# Patient Record
Sex: MALE | Race: OTHER | Employment: FULL TIME | URBAN - METROPOLITAN AREA
[De-identification: names, ages, dates, MRNs, and addresses within clinical notes are randomized per-mention and may not be internally consistent; named-entity substitution may affect disease eponyms.]

---

## 2022-06-19 ENCOUNTER — APPOINTMENT (OUTPATIENT)
Dept: CT IMAGING | Age: 32
DRG: 418 | End: 2022-06-19
Payer: COMMERCIAL

## 2022-06-19 ENCOUNTER — ANESTHESIA (OUTPATIENT)
Dept: OPERATING ROOM | Age: 32
DRG: 418 | End: 2022-06-19
Payer: COMMERCIAL

## 2022-06-19 ENCOUNTER — HOSPITAL ENCOUNTER (INPATIENT)
Age: 32
LOS: 2 days | Discharge: HOME | DRG: 418 | End: 2022-06-21
Attending: INTERNAL MEDICINE | Admitting: HOSPITALIST
Payer: COMMERCIAL

## 2022-06-19 ENCOUNTER — APPOINTMENT (OUTPATIENT)
Dept: GENERAL RADIOLOGY | Age: 32
DRG: 418 | End: 2022-06-19
Payer: COMMERCIAL

## 2022-06-19 ENCOUNTER — ANESTHESIA EVENT (OUTPATIENT)
Dept: OPERATING ROOM | Age: 32
DRG: 418 | End: 2022-06-19
Payer: COMMERCIAL

## 2022-06-19 DIAGNOSIS — R74.01 TRANSAMINITIS: ICD-10-CM

## 2022-06-19 DIAGNOSIS — K81.9 CHOLECYSTITIS: ICD-10-CM

## 2022-06-19 DIAGNOSIS — K81.0 ACUTE CHOLECYSTITIS: Primary | ICD-10-CM

## 2022-06-19 PROBLEM — R11.2 NAUSEA & VOMITING: Status: ACTIVE | Noted: 2022-06-19

## 2022-06-19 PROBLEM — R10.13 EPIGASTRIC ABDOMINAL PAIN: Status: ACTIVE | Noted: 2022-06-19

## 2022-06-19 LAB
A/G RATIO: 1 (ref 1.1–2.2)
A/G RATIO: 1.3 (ref 1.1–2.2)
ALBUMIN SERPL-MCNC: 3.8 G/DL (ref 3.4–5)
ALBUMIN SERPL-MCNC: 4.4 G/DL (ref 3.4–5)
ALP BLD-CCNC: 225 U/L (ref 40–129)
ALP BLD-CCNC: 238 U/L (ref 40–129)
ALT SERPL-CCNC: 269 U/L (ref 10–40)
ALT SERPL-CCNC: 514 U/L (ref 10–40)
ANION GAP SERPL CALCULATED.3IONS-SCNC: 11 MMOL/L (ref 3–16)
ANION GAP SERPL CALCULATED.3IONS-SCNC: 9 MMOL/L (ref 3–16)
AST SERPL-CCNC: 354 U/L (ref 15–37)
AST SERPL-CCNC: 493 U/L (ref 15–37)
BASOPHILS ABSOLUTE: 0.1 K/UL (ref 0–0.2)
BASOPHILS RELATIVE PERCENT: 1.3 %
BILIRUB SERPL-MCNC: 2.5 MG/DL (ref 0–1)
BILIRUB SERPL-MCNC: 4.1 MG/DL (ref 0–1)
BILIRUBIN URINE: NEGATIVE
BLOOD, URINE: NEGATIVE
BUN BLDV-MCNC: 11 MG/DL (ref 7–20)
BUN BLDV-MCNC: 13 MG/DL (ref 7–20)
CALCIUM SERPL-MCNC: 9.4 MG/DL (ref 8.3–10.6)
CALCIUM SERPL-MCNC: 9.4 MG/DL (ref 8.3–10.6)
CHLORIDE BLD-SCNC: 97 MMOL/L (ref 99–110)
CHLORIDE BLD-SCNC: 98 MMOL/L (ref 99–110)
CLARITY: CLEAR
CO2: 27 MMOL/L (ref 21–32)
CO2: 29 MMOL/L (ref 21–32)
COLOR: YELLOW
CREAT SERPL-MCNC: 0.8 MG/DL (ref 0.9–1.3)
CREAT SERPL-MCNC: 1 MG/DL (ref 0.9–1.3)
EOSINOPHILS ABSOLUTE: 0.1 K/UL (ref 0–0.6)
EOSINOPHILS RELATIVE PERCENT: 0.7 %
GFR AFRICAN AMERICAN: >60
GFR AFRICAN AMERICAN: >60
GFR NON-AFRICAN AMERICAN: >60
GFR NON-AFRICAN AMERICAN: >60
GLUCOSE BLD-MCNC: 136 MG/DL (ref 70–99)
GLUCOSE BLD-MCNC: 157 MG/DL (ref 70–99)
GLUCOSE URINE: NEGATIVE MG/DL
HAV IGM SER IA-ACNC: NORMAL
HCT VFR BLD CALC: 44.8 % (ref 40.5–52.5)
HEMOGLOBIN: 15.1 G/DL (ref 13.5–17.5)
HEPATITIS B CORE IGM ANTIBODY: NORMAL
HEPATITIS B SURFACE ANTIGEN INTERPRETATION: NORMAL
HEPATITIS C ANTIBODY INTERPRETATION: NORMAL
KETONES, URINE: 15 MG/DL
LEUKOCYTE ESTERASE, URINE: NEGATIVE
LIPASE: 21 U/L (ref 13–60)
LYMPHOCYTES ABSOLUTE: 0.6 K/UL (ref 1–5.1)
LYMPHOCYTES RELATIVE PERCENT: 6.1 %
MCH RBC QN AUTO: 30.8 PG (ref 26–34)
MCHC RBC AUTO-ENTMCNC: 33.6 G/DL (ref 31–36)
MCV RBC AUTO: 91.6 FL (ref 80–100)
MICROSCOPIC EXAMINATION: ABNORMAL
MONOCYTES ABSOLUTE: 0.7 K/UL (ref 0–1.3)
MONOCYTES RELATIVE PERCENT: 6.7 %
NEUTROPHILS ABSOLUTE: 8.8 K/UL (ref 1.7–7.7)
NEUTROPHILS RELATIVE PERCENT: 85.2 %
NITRITE, URINE: NEGATIVE
PDW BLD-RTO: 13.1 % (ref 12.4–15.4)
PH UA: 8 (ref 5–8)
PLATELET # BLD: 261 K/UL (ref 135–450)
PMV BLD AUTO: 7.5 FL (ref 5–10.5)
POTASSIUM REFLEX MAGNESIUM: 3.8 MMOL/L (ref 3.5–5.1)
POTASSIUM REFLEX MAGNESIUM: 3.8 MMOL/L (ref 3.5–5.1)
PROTEIN UA: NEGATIVE MG/DL
RBC # BLD: 4.89 M/UL (ref 4.2–5.9)
SODIUM BLD-SCNC: 135 MMOL/L (ref 136–145)
SODIUM BLD-SCNC: 136 MMOL/L (ref 136–145)
SPECIFIC GRAVITY UA: 1.01 (ref 1–1.03)
TOTAL PROTEIN: 7.8 G/DL (ref 6.4–8.2)
TOTAL PROTEIN: 7.8 G/DL (ref 6.4–8.2)
URINE REFLEX TO CULTURE: ABNORMAL
URINE TYPE: ABNORMAL
UROBILINOGEN, URINE: 2 E.U./DL
WBC # BLD: 10.3 K/UL (ref 4–11)

## 2022-06-19 PROCEDURE — 2580000003 HC RX 258: Performed by: INTERNAL MEDICINE

## 2022-06-19 PROCEDURE — 6360000002 HC RX W HCPCS: Performed by: INTERNAL MEDICINE

## 2022-06-19 PROCEDURE — 96375 TX/PRO/DX INJ NEW DRUG ADDON: CPT

## 2022-06-19 PROCEDURE — 83690 ASSAY OF LIPASE: CPT

## 2022-06-19 PROCEDURE — 6360000004 HC RX CONTRAST MEDICATION: Performed by: SPECIALIST

## 2022-06-19 PROCEDURE — 2720000010 HC SURG SUPPLY STERILE: Performed by: SPECIALIST

## 2022-06-19 PROCEDURE — 1200000000 HC SEMI PRIVATE

## 2022-06-19 PROCEDURE — C9113 INJ PANTOPRAZOLE SODIUM, VIA: HCPCS | Performed by: PHYSICIAN ASSISTANT

## 2022-06-19 PROCEDURE — 0FT44ZZ RESECTION OF GALLBLADDER, PERCUTANEOUS ENDOSCOPIC APPROACH: ICD-10-PCS | Performed by: INTERNAL MEDICINE

## 2022-06-19 PROCEDURE — 85025 COMPLETE CBC W/AUTO DIFF WBC: CPT

## 2022-06-19 PROCEDURE — 6370000000 HC RX 637 (ALT 250 FOR IP): Performed by: PHYSICIAN ASSISTANT

## 2022-06-19 PROCEDURE — A4217 STERILE WATER/SALINE, 500 ML: HCPCS | Performed by: SPECIALIST

## 2022-06-19 PROCEDURE — 2500000003 HC RX 250 WO HCPCS: Performed by: SPECIALIST

## 2022-06-19 PROCEDURE — 99221 1ST HOSP IP/OBS SF/LOW 40: CPT | Performed by: SPECIALIST

## 2022-06-19 PROCEDURE — 6360000002 HC RX W HCPCS: Performed by: ANESTHESIOLOGY

## 2022-06-19 PROCEDURE — 2500000003 HC RX 250 WO HCPCS: Performed by: ANESTHESIOLOGY

## 2022-06-19 PROCEDURE — 94150 VITAL CAPACITY TEST: CPT

## 2022-06-19 PROCEDURE — 36415 COLL VENOUS BLD VENIPUNCTURE: CPT

## 2022-06-19 PROCEDURE — 3600000004 HC SURGERY LEVEL 4 BASE: Performed by: SPECIALIST

## 2022-06-19 PROCEDURE — 3700000000 HC ANESTHESIA ATTENDED CARE: Performed by: SPECIALIST

## 2022-06-19 PROCEDURE — C9113 INJ PANTOPRAZOLE SODIUM, VIA: HCPCS | Performed by: SPECIALIST

## 2022-06-19 PROCEDURE — 80074 ACUTE HEPATITIS PANEL: CPT

## 2022-06-19 PROCEDURE — 2580000003 HC RX 258: Performed by: ANESTHESIOLOGY

## 2022-06-19 PROCEDURE — 94760 N-INVAS EAR/PLS OXIMETRY 1: CPT

## 2022-06-19 PROCEDURE — 74176 CT ABD & PELVIS W/O CONTRAST: CPT

## 2022-06-19 PROCEDURE — 88304 TISSUE EXAM BY PATHOLOGIST: CPT

## 2022-06-19 PROCEDURE — 96365 THER/PROPH/DIAG IV INF INIT: CPT

## 2022-06-19 PROCEDURE — 81003 URINALYSIS AUTO W/O SCOPE: CPT

## 2022-06-19 PROCEDURE — 6360000002 HC RX W HCPCS: Performed by: PHYSICIAN ASSISTANT

## 2022-06-19 PROCEDURE — 6360000002 HC RX W HCPCS: Performed by: SPECIALIST

## 2022-06-19 PROCEDURE — 47562 LAPAROSCOPIC CHOLECYSTECTOMY: CPT | Performed by: SPECIALIST

## 2022-06-19 PROCEDURE — 80053 COMPREHEN METABOLIC PANEL: CPT

## 2022-06-19 PROCEDURE — 3700000001 HC ADD 15 MINUTES (ANESTHESIA): Performed by: SPECIALIST

## 2022-06-19 PROCEDURE — 2709999900 HC NON-CHARGEABLE SUPPLY: Performed by: SPECIALIST

## 2022-06-19 PROCEDURE — 2580000003 HC RX 258: Performed by: PHYSICIAN ASSISTANT

## 2022-06-19 PROCEDURE — C1894 INTRO/SHEATH, NON-LASER: HCPCS | Performed by: SPECIALIST

## 2022-06-19 PROCEDURE — 3600000014 HC SURGERY LEVEL 4 ADDTL 15MIN: Performed by: SPECIALIST

## 2022-06-19 PROCEDURE — 6370000000 HC RX 637 (ALT 250 FOR IP): Performed by: INTERNAL MEDICINE

## 2022-06-19 PROCEDURE — 7100000000 HC PACU RECOVERY - FIRST 15 MIN: Performed by: SPECIALIST

## 2022-06-19 PROCEDURE — 99285 EMERGENCY DEPT VISIT HI MDM: CPT

## 2022-06-19 PROCEDURE — 2580000003 HC RX 258: Performed by: SPECIALIST

## 2022-06-19 PROCEDURE — 74300 X-RAY BILE DUCTS/PANCREAS: CPT

## 2022-06-19 PROCEDURE — 96376 TX/PRO/DX INJ SAME DRUG ADON: CPT

## 2022-06-19 PROCEDURE — 7100000001 HC PACU RECOVERY - ADDTL 15 MIN: Performed by: SPECIALIST

## 2022-06-19 PROCEDURE — BF131ZZ FLUOROSCOPY OF GALLBLADDER AND BILE DUCTS USING LOW OSMOLAR CONTRAST: ICD-10-PCS | Performed by: INTERNAL MEDICINE

## 2022-06-19 RX ORDER — SODIUM CHLORIDE 0.9 % (FLUSH) 0.9 %
5-40 SYRINGE (ML) INJECTION EVERY 12 HOURS SCHEDULED
Status: DISCONTINUED | OUTPATIENT
Start: 2022-06-19 | End: 2022-06-19 | Stop reason: HOSPADM

## 2022-06-19 RX ORDER — ONDANSETRON 2 MG/ML
4 INJECTION INTRAMUSCULAR; INTRAVENOUS ONCE
Status: COMPLETED | OUTPATIENT
Start: 2022-06-19 | End: 2022-06-19

## 2022-06-19 RX ORDER — PANTOPRAZOLE SODIUM 40 MG/10ML
40 INJECTION, POWDER, LYOPHILIZED, FOR SOLUTION INTRAVENOUS ONCE
Status: COMPLETED | OUTPATIENT
Start: 2022-06-19 | End: 2022-06-19

## 2022-06-19 RX ORDER — MEPERIDINE HYDROCHLORIDE 25 MG/ML
12.5 INJECTION INTRAMUSCULAR; INTRAVENOUS; SUBCUTANEOUS EVERY 5 MIN PRN
Status: DISCONTINUED | OUTPATIENT
Start: 2022-06-19 | End: 2022-06-19 | Stop reason: HOSPADM

## 2022-06-19 RX ORDER — HYDROCODONE BITARTRATE AND ACETAMINOPHEN 5; 325 MG/1; MG/1
1 TABLET ORAL EVERY 6 HOURS PRN
Status: DISCONTINUED | OUTPATIENT
Start: 2022-06-19 | End: 2022-06-19

## 2022-06-19 RX ORDER — ENOXAPARIN SODIUM 100 MG/ML
40 INJECTION SUBCUTANEOUS DAILY
Status: DISCONTINUED | OUTPATIENT
Start: 2022-06-19 | End: 2022-06-21 | Stop reason: HOSPADM

## 2022-06-19 RX ORDER — ACETAMINOPHEN 650 MG/1
650 SUPPOSITORY RECTAL EVERY 6 HOURS PRN
Status: DISCONTINUED | OUTPATIENT
Start: 2022-06-19 | End: 2022-06-21 | Stop reason: HOSPADM

## 2022-06-19 RX ORDER — SODIUM CHLORIDE, SODIUM LACTATE, POTASSIUM CHLORIDE, CALCIUM CHLORIDE 600; 310; 30; 20 MG/100ML; MG/100ML; MG/100ML; MG/100ML
INJECTION, SOLUTION INTRAVENOUS CONTINUOUS PRN
Status: COMPLETED | OUTPATIENT
Start: 2022-06-19 | End: 2022-06-19

## 2022-06-19 RX ORDER — ONDANSETRON 2 MG/ML
4 INJECTION INTRAMUSCULAR; INTRAVENOUS EVERY 6 HOURS PRN
Status: DISCONTINUED | OUTPATIENT
Start: 2022-06-19 | End: 2022-06-21 | Stop reason: HOSPADM

## 2022-06-19 RX ORDER — MAGNESIUM HYDROXIDE 1200 MG/15ML
LIQUID ORAL CONTINUOUS PRN
Status: COMPLETED | OUTPATIENT
Start: 2022-06-19 | End: 2022-06-19

## 2022-06-19 RX ORDER — HYDROMORPHONE HYDROCHLORIDE 2 MG/1
1 TABLET ORAL EVERY 4 HOURS PRN
Status: DISCONTINUED | OUTPATIENT
Start: 2022-06-19 | End: 2022-06-19

## 2022-06-19 RX ORDER — PANTOPRAZOLE SODIUM 40 MG/10ML
40 INJECTION, POWDER, LYOPHILIZED, FOR SOLUTION INTRAVENOUS DAILY
Status: DISCONTINUED | OUTPATIENT
Start: 2022-06-19 | End: 2022-06-21 | Stop reason: HOSPADM

## 2022-06-19 RX ORDER — 0.9 % SODIUM CHLORIDE 0.9 %
1000 INTRAVENOUS SOLUTION INTRAVENOUS ONCE
Status: COMPLETED | OUTPATIENT
Start: 2022-06-19 | End: 2022-06-19

## 2022-06-19 RX ORDER — OXYCODONE HYDROCHLORIDE AND ACETAMINOPHEN 5; 325 MG/1; MG/1
1 TABLET ORAL EVERY 4 HOURS PRN
Status: DISCONTINUED | OUTPATIENT
Start: 2022-06-19 | End: 2022-06-21 | Stop reason: HOSPADM

## 2022-06-19 RX ORDER — ACETAMINOPHEN 325 MG/1
650 TABLET ORAL EVERY 6 HOURS PRN
Status: DISCONTINUED | OUTPATIENT
Start: 2022-06-19 | End: 2022-06-21 | Stop reason: HOSPADM

## 2022-06-19 RX ORDER — FENTANYL CITRATE 50 UG/ML
INJECTION, SOLUTION INTRAMUSCULAR; INTRAVENOUS PRN
Status: DISCONTINUED | OUTPATIENT
Start: 2022-06-19 | End: 2022-06-19

## 2022-06-19 RX ORDER — SODIUM CHLORIDE 0.9 % (FLUSH) 0.9 %
5-40 SYRINGE (ML) INJECTION PRN
Status: DISCONTINUED | OUTPATIENT
Start: 2022-06-19 | End: 2022-06-19 | Stop reason: HOSPADM

## 2022-06-19 RX ORDER — MORPHINE SULFATE 2 MG/ML
2 INJECTION, SOLUTION INTRAMUSCULAR; INTRAVENOUS
Status: DISCONTINUED | OUTPATIENT
Start: 2022-06-19 | End: 2022-06-20

## 2022-06-19 RX ORDER — SODIUM CHLORIDE 9 MG/ML
INJECTION, SOLUTION INTRAVENOUS CONTINUOUS
Status: DISCONTINUED | OUTPATIENT
Start: 2022-06-19 | End: 2022-06-20

## 2022-06-19 RX ORDER — ONDANSETRON 4 MG/1
4 TABLET, ORALLY DISINTEGRATING ORAL EVERY 8 HOURS PRN
Status: DISCONTINUED | OUTPATIENT
Start: 2022-06-19 | End: 2022-06-19

## 2022-06-19 RX ORDER — PROPOFOL 10 MG/ML
INJECTION, EMULSION INTRAVENOUS PRN
Status: DISCONTINUED | OUTPATIENT
Start: 2022-06-19 | End: 2022-06-19 | Stop reason: SDUPTHER

## 2022-06-19 RX ORDER — SODIUM CHLORIDE 9 MG/ML
INJECTION, SOLUTION INTRAVENOUS PRN
Status: DISCONTINUED | OUTPATIENT
Start: 2022-06-19 | End: 2022-06-19 | Stop reason: HOSPADM

## 2022-06-19 RX ORDER — POLYETHYLENE GLYCOL 3350 17 G/17G
17 POWDER, FOR SOLUTION ORAL DAILY PRN
Status: DISCONTINUED | OUTPATIENT
Start: 2022-06-19 | End: 2022-06-21 | Stop reason: HOSPADM

## 2022-06-19 RX ORDER — MORPHINE SULFATE 4 MG/ML
4 INJECTION, SOLUTION INTRAMUSCULAR; INTRAVENOUS ONCE
Status: COMPLETED | OUTPATIENT
Start: 2022-06-19 | End: 2022-06-19

## 2022-06-19 RX ORDER — BUPIVACAINE HYDROCHLORIDE 5 MG/ML
INJECTION, SOLUTION EPIDURAL; INTRACAUDAL
Status: COMPLETED | OUTPATIENT
Start: 2022-06-19 | End: 2022-06-19

## 2022-06-19 RX ORDER — MORPHINE SULFATE 4 MG/ML
4 INJECTION, SOLUTION INTRAMUSCULAR; INTRAVENOUS
Status: DISCONTINUED | OUTPATIENT
Start: 2022-06-19 | End: 2022-06-19

## 2022-06-19 RX ORDER — HYDROMORPHONE HCL 110MG/55ML
0.25 PATIENT CONTROLLED ANALGESIA SYRINGE INTRAVENOUS EVERY 5 MIN PRN
Status: DISCONTINUED | OUTPATIENT
Start: 2022-06-19 | End: 2022-06-19 | Stop reason: HOSPADM

## 2022-06-19 RX ORDER — HYDROMORPHONE HCL 110MG/55ML
0.5 PATIENT CONTROLLED ANALGESIA SYRINGE INTRAVENOUS EVERY 5 MIN PRN
Status: DISCONTINUED | OUTPATIENT
Start: 2022-06-19 | End: 2022-06-19 | Stop reason: HOSPADM

## 2022-06-19 RX ORDER — KETOROLAC TROMETHAMINE 30 MG/ML
INJECTION, SOLUTION INTRAMUSCULAR; INTRAVENOUS PRN
Status: DISCONTINUED | OUTPATIENT
Start: 2022-06-19 | End: 2022-06-19 | Stop reason: SDUPTHER

## 2022-06-19 RX ORDER — ONDANSETRON 2 MG/ML
4 INJECTION INTRAMUSCULAR; INTRAVENOUS EVERY 6 HOURS PRN
Status: DISCONTINUED | OUTPATIENT
Start: 2022-06-19 | End: 2022-06-19 | Stop reason: SDUPTHER

## 2022-06-19 RX ORDER — ONDANSETRON 2 MG/ML
4 INJECTION INTRAMUSCULAR; INTRAVENOUS
Status: DISCONTINUED | OUTPATIENT
Start: 2022-06-19 | End: 2022-06-19

## 2022-06-19 RX ORDER — SODIUM CHLORIDE 9 MG/ML
INJECTION, SOLUTION INTRAVENOUS PRN
Status: DISCONTINUED | OUTPATIENT
Start: 2022-06-19 | End: 2022-06-21 | Stop reason: HOSPADM

## 2022-06-19 RX ORDER — SODIUM CHLORIDE, SODIUM LACTATE, POTASSIUM CHLORIDE, CALCIUM CHLORIDE 600; 310; 30; 20 MG/100ML; MG/100ML; MG/100ML; MG/100ML
INJECTION, SOLUTION INTRAVENOUS CONTINUOUS PRN
Status: DISCONTINUED | OUTPATIENT
Start: 2022-06-19 | End: 2022-06-19 | Stop reason: SDUPTHER

## 2022-06-19 RX ORDER — SODIUM CHLORIDE 0.9 % (FLUSH) 0.9 %
5-40 SYRINGE (ML) INJECTION EVERY 12 HOURS SCHEDULED
Status: DISCONTINUED | OUTPATIENT
Start: 2022-06-19 | End: 2022-06-21 | Stop reason: HOSPADM

## 2022-06-19 RX ORDER — SODIUM CHLORIDE 0.9 % (FLUSH) 0.9 %
5-40 SYRINGE (ML) INJECTION PRN
Status: DISCONTINUED | OUTPATIENT
Start: 2022-06-19 | End: 2022-06-21 | Stop reason: HOSPADM

## 2022-06-19 RX ORDER — DEXAMETHASONE SODIUM PHOSPHATE 10 MG/ML
INJECTION, SOLUTION INTRAMUSCULAR; INTRAVENOUS PRN
Status: DISCONTINUED | OUTPATIENT
Start: 2022-06-19 | End: 2022-06-19 | Stop reason: SDUPTHER

## 2022-06-19 RX ORDER — KETOROLAC TROMETHAMINE 30 MG/ML
30 INJECTION, SOLUTION INTRAMUSCULAR; INTRAVENOUS ONCE
Status: COMPLETED | OUTPATIENT
Start: 2022-06-19 | End: 2022-06-19

## 2022-06-19 RX ORDER — LIDOCAINE HYDROCHLORIDE 20 MG/ML
INJECTION, SOLUTION EPIDURAL; INFILTRATION; INTRACAUDAL; PERINEURAL PRN
Status: DISCONTINUED | OUTPATIENT
Start: 2022-06-19 | End: 2022-06-19 | Stop reason: SDUPTHER

## 2022-06-19 RX ORDER — DIPHENHYDRAMINE HYDROCHLORIDE 50 MG/ML
12.5 INJECTION INTRAMUSCULAR; INTRAVENOUS
Status: DISCONTINUED | OUTPATIENT
Start: 2022-06-19 | End: 2022-06-19 | Stop reason: HOSPADM

## 2022-06-19 RX ADMIN — PROPOFOL 200 MG: 10 INJECTION, EMULSION INTRAVENOUS at 13:46

## 2022-06-19 RX ADMIN — DEXAMETHASONE SODIUM PHOSPHATE 4 MG: 10 INJECTION, SOLUTION INTRAMUSCULAR; INTRAVENOUS at 14:07

## 2022-06-19 RX ADMIN — KETOROLAC TROMETHAMINE 30 MG: 30 INJECTION, SOLUTION INTRAMUSCULAR at 01:42

## 2022-06-19 RX ADMIN — Medication 10 ML: at 08:22

## 2022-06-19 RX ADMIN — ONDANSETRON 4 MG: 2 INJECTION INTRAMUSCULAR; INTRAVENOUS at 02:48

## 2022-06-19 RX ADMIN — PIPERACILLIN AND TAZOBACTAM 3375 MG: 3; .375 INJECTION, POWDER, LYOPHILIZED, FOR SOLUTION INTRAVENOUS at 11:43

## 2022-06-19 RX ADMIN — LIDOCAINE HYDROCHLORIDE: 20 SOLUTION ORAL; TOPICAL at 01:47

## 2022-06-19 RX ADMIN — SODIUM CHLORIDE 1000 ML: 9 INJECTION, SOLUTION INTRAVENOUS at 01:46

## 2022-06-19 RX ADMIN — PIPERACILLIN AND TAZOBACTAM 3375 MG: 3; .375 INJECTION, POWDER, LYOPHILIZED, FOR SOLUTION INTRAVENOUS at 18:27

## 2022-06-19 RX ADMIN — ENOXAPARIN SODIUM 40 MG: 100 INJECTION SUBCUTANEOUS at 08:22

## 2022-06-19 RX ADMIN — MORPHINE SULFATE 4 MG: 4 INJECTION INTRAVENOUS at 11:40

## 2022-06-19 RX ADMIN — Medication 10 ML: at 22:56

## 2022-06-19 RX ADMIN — PANTOPRAZOLE SODIUM 40 MG: 40 INJECTION, POWDER, LYOPHILIZED, FOR SOLUTION INTRAVENOUS at 01:44

## 2022-06-19 RX ADMIN — KETOROLAC TROMETHAMINE 30 MG: 60 INJECTION, SOLUTION INTRAMUSCULAR at 14:07

## 2022-06-19 RX ADMIN — PIPERACILLIN AND TAZOBACTAM 3375 MG: 3; .375 INJECTION, POWDER, LYOPHILIZED, FOR SOLUTION INTRAVENOUS at 02:51

## 2022-06-19 RX ADMIN — SODIUM CHLORIDE: 9 INJECTION, SOLUTION INTRAVENOUS at 06:14

## 2022-06-19 RX ADMIN — PANTOPRAZOLE SODIUM 40 MG: 40 INJECTION, POWDER, LYOPHILIZED, FOR SOLUTION INTRAVENOUS at 17:20

## 2022-06-19 RX ADMIN — MORPHINE SULFATE 4 MG: 4 INJECTION INTRAVENOUS at 02:48

## 2022-06-19 RX ADMIN — FENTANYL CITRATE 50 MCG: 50 INJECTION, SOLUTION INTRAMUSCULAR; INTRAVENOUS at 14:03

## 2022-06-19 RX ADMIN — LIDOCAINE HYDROCHLORIDE 100 MG: 20 INJECTION, SOLUTION EPIDURAL; INFILTRATION; INTRACAUDAL; PERINEURAL at 13:44

## 2022-06-19 RX ADMIN — SODIUM CHLORIDE, POTASSIUM CHLORIDE, SODIUM LACTATE AND CALCIUM CHLORIDE: 600; 310; 30; 20 INJECTION, SOLUTION INTRAVENOUS at 13:46

## 2022-06-19 RX ADMIN — HYDROMORPHONE HYDROCHLORIDE 0.5 MG: 2 INJECTION, SOLUTION INTRAMUSCULAR; INTRAVENOUS; SUBCUTANEOUS at 15:26

## 2022-06-19 RX ADMIN — ONDANSETRON 4 MG: 2 INJECTION INTRAMUSCULAR; INTRAVENOUS at 01:42

## 2022-06-19 RX ADMIN — FENTANYL CITRATE 50 MCG: 50 INJECTION, SOLUTION INTRAMUSCULAR; INTRAVENOUS at 13:46

## 2022-06-19 RX ADMIN — ONDANSETRON 4 MG: 4 TABLET, ORALLY DISINTEGRATING ORAL at 14:05

## 2022-06-19 RX ADMIN — MORPHINE SULFATE 4 MG: 4 INJECTION INTRAVENOUS at 06:08

## 2022-06-19 RX ADMIN — MORPHINE SULFATE 4 MG: 4 INJECTION INTRAVENOUS at 08:21

## 2022-06-19 RX ADMIN — MORPHINE SULFATE 2 MG: 2 INJECTION, SOLUTION INTRAMUSCULAR; INTRAVENOUS at 22:53

## 2022-06-19 ASSESSMENT — PAIN SCALES - GENERAL
PAINLEVEL_OUTOF10: 9
PAINLEVEL_OUTOF10: 9
PAINLEVEL_OUTOF10: 10
PAINLEVEL_OUTOF10: 0
PAINLEVEL_OUTOF10: 7
PAINLEVEL_OUTOF10: 8

## 2022-06-19 ASSESSMENT — PAIN DESCRIPTION - DESCRIPTORS
DESCRIPTORS: ACHING

## 2022-06-19 ASSESSMENT — PAIN DESCRIPTION - ORIENTATION
ORIENTATION: MID;LOWER

## 2022-06-19 ASSESSMENT — ENCOUNTER SYMPTOMS
CONSTIPATION: 0
BACK PAIN: 0
COUGH: 0
VOMITING: 1
CHEST TIGHTNESS: 0
DIARRHEA: 0
RESPIRATORY NEGATIVE: 1
NAUSEA: 1
SHORTNESS OF BREATH: 0
ABDOMINAL PAIN: 1

## 2022-06-19 ASSESSMENT — PAIN DESCRIPTION - LOCATION
LOCATION: ABDOMEN

## 2022-06-19 ASSESSMENT — PAIN DESCRIPTION - PAIN TYPE
TYPE: ACUTE PAIN

## 2022-06-19 ASSESSMENT — PAIN - FUNCTIONAL ASSESSMENT: PAIN_FUNCTIONAL_ASSESSMENT: 0-10

## 2022-06-19 NOTE — PROGRESS NOTES
Patient admitted after midnight with RUQ pain. CT revealed cholecystitis. Currently on zosyn. Morphine not helping. Will change to dilaudid. Discussed with surgery. Going to OR today.          Kimberly Quesada PA-C

## 2022-06-19 NOTE — PROGRESS NOTES
Patient to PACU from OR. Drowsy. VSS. Abd soft, x 4 lap sites intact, JOHANNY drain in place to right side abdomen. Will monitor.

## 2022-06-19 NOTE — CONSULTS
Gastroenterology Consult Note                          Patient: Debbie Lopez  : 1990  CSN#:      Date:  2022    Subjective:       History of Present Illness  Patient is a 28 y.o.  male admitted with Acute cholecystitis [K81.0] who is seen in consult for abd pain, abnl LFT. Presentation:    Debbie Lopez is a 28 y.o. male with no significant past medical history who presents to the ED with complaint of abdominal pain. Patient speaks primarily [address] and  was utilized for history and physical examination. Patient states he has had severe pain that he rates as a 10/10 to his upper abdomen has been ongoing for the past couple days. He states that been worsened over the past 24 hours. He states is worse when he eats. He states associated nausea and vomiting. Denies urinary symptoms or changes in bowel movements. Denies fever chills. Denies chest pain or shortness of breath. Denies any rashes or lesions. Denies any surgeries to his abdomen the past.  Denies history of similar symptoms in the past.  He denies any known history of GERD, gastritis, reflux or peptic ulcer disease. He denies significant alcohol use. Denies any drug use specifically marijuana. Denies significant NSAID usage. Denies taking any over-the-counter medication for symptom control. CT was done:  1. The gallbladder is distended, with mild adjacent haziness noted. Recommend further evaluation with right upper quadrant ultrasound, as   cholecystitis is in the differential.  Diffuse intraluminal higher   attenuation could be related to sludge or stones. 2. Tiny punctate nonobstructive renal calculi, without ureteral calculi or   hydroureteronephrosis. 3. No acute inflammatory bowel process.      Labs show alk [id number], alt 269, ouu679,    Lipase NL    Pt started zosyn, surgery also consulted    By the time I have arrived to see the patient, it is noted that he has been to the OR already for CCY of a gangrenous GB. It is also noted that [address] was attempted but not successful. No past medical history on file. No past surgical history on file. Past Endoscopic Historyn/a    Admission Meds  No current facility-administered medications on file prior to encounter. No current outpatient medications on file prior to encounter. Allergies  No Known Allergies   Social   Social History     Tobacco Use    Smoking status: Never Smoker    Smokeless tobacco: Never Used   Substance Use Topics    Alcohol use: Never        No family history on file. Review of Systems  Pertinent items are noted in HPI. Physical Exam  Blood pressure 128/78, pulse 86, temperature 98.9 °F (37.2 °C), temperature source Oral, resp. rate 16, height 5' 4\" (1.626 m), weight 200 lb (90.7 kg), SpO2 98 %. Pt is post op in recovery      Data Review:    Recent Labs     06/19/22 0141   WBC 10.3   HGB 15.1   HCT 44.8   MCV 91.6        Recent Labs     06/19/22 0141   *   K 3.8   CL 97*   CO2 29   BUN 13   CREATININE 1.0     Recent Labs     06/19/22 0141   *   *   BILITOT 2.5*   ALKPHOS 225*     Recent Labs     06/19/22 0141   LIPASE 21.0     No results for input(s): PROTIME, INR in the last 72 hours. No results for input(s): PTT in the last 72 hours. No results for input(s): OCCULTBLD in the last 72 hours. Imaging Studies:                          above               Assessment:     Principal Problem:    Acute cholecystitis  Active Problems:    Epigastric abdominal pain    Nausea & vomiting    Transaminitis  Resolved Problems:    * No resolved hospital problems. *    Pt is now s/p CCY for gangrenous gb  IOC could not be performed    Recommendations: We will follow with you to insure resolution of LFT changes and nothing to suggest CBD stone. Further imaging could include MRCP if there remains question. Follow LFT.

## 2022-06-19 NOTE — PROGRESS NOTES
Incentive Spirometry education and demonstration completed by Respiratory Therapy Yes      Response to education: Poor     Teaching Time: 5 minutes    Minimum Predicted Vital Capacity - 592 mL. Patient's Actual Vital Capacity - 250 mL.  Turning over to Nursing for routine follow-up No.    Comments: **    Electronically signed by Lilly Stiles RCP on 6/19/2022 at 4:15 PM

## 2022-06-19 NOTE — CONSULTS
Surgical consultation  Chief complaint right upper quadrant abdominal pain    Patient is a 40-year-old male presents to the emergency room with a 2 to 3-day history of right upper quadrant abdominal pain he states the pain is sharp unrelenting exacerbated by cough or movement he has associated nausea and vomiting as well as fever and chills he denies frequency urgency or dysuria he denies prior history of fatty food intolerance or dyspepsia while in the emergency room patient had CT scan which shows inflammatory changes of the gallbladder he has an elevated white count and his hepatic profile is abnormal with elevated LFTs and T bilirubin of 2.5    Past medical history none    Past surgical history none    Allergies none    Previous medications none    Social history patient does not smoke or drink    Family history noncontributory    Review of systems patient denies any vertigo diplopia no circumoral numbness dysarthria dysphagia no heart cold intolerance no history of jaundice dimitrios colored stools or dark-colored urine appetite is generally good weight is stable no hematemesis medic easier melena no blood dyscrasias    Physical examination  Patient is moderate to severe amount of distress holding his right upper abdomen    His vital signs are stable he is afebrile new line General appears to be in distress  HEENT normocephalic atraumatic extract muscles intact pupils equal round reactive to light and accommodate oropharynx is clear neck supple without adenopathy  Chest clear to auscultation percussion  Cardiovascular regular rate and rhythm  Abdomen is soft with moderate to severe tenderness in the right upper quadrant with guarding and rebound no CVA tenderness no groin hernias rectal examination is deferred per patient request  Musculoskeletal is 5/5  Neurologically cranial nerves II through XII intact deep tendon reflexes are 2+ and symmetric    Laboratories patient has a total bilirubin of 4.1 and alk phos of 238 and ALT of 514 and an AST of 493 his lipase is normal his white count is 10.3  CT scan of the abdomen pelvis shows inflammatory changes associated with the gallbladder with sludge intraluminally there is no discrete stone seen in the common bile duct nor is there biliary distention. Assessment and plan this is a patient with right upper quadrant abdominal pain with mild peritoneal findings by CT scan he has inflammatory changes associated with the gallbladder and some gallbladder distention there is no discrete stones within the common bile duct nor evidence of biliary dilation while the patient appears to be suffering from acute cholecystitis with cholelithiasis a common bile duct stone cannot be completely excluded despite the information evident at this time.   Given his severe pain I am inclined to take the patient to the operating room as soon as possible to perform a laparoscopic cholecystectomy with an intraoperative cholangiogram if the patient has a stone postoperatively gastroenterologist can perform an ERCP with stone extraction I have discussed this with the patient through an  and he wishes to proceed with the surgical procedure

## 2022-06-19 NOTE — OP NOTE
Operative Note      Patient: Sabiha Vinson  YOB: 1990  MRN: [id number]    Date of Procedure: 6/19/2022    Pre-Op Diagnosis: Acute cholecystitis    Post-Op Diagnosis: Gangrenous cholecystitis       Procedure(s):  CHOLECYSTECTOMY LAPAROSCOPIC, ATTEMPTED CHOLANGIOGRAM    Surgeon(s):  Bjorn Betts MD    Assistant:   Surgical Assistant: Erika Black    Anesthesia: General    Estimated Blood Loss (mL): less than 004     Complications: None    Specimens:   ID Type Source Tests Collected by Time Destination   A : gall bladder     Tissue Gallbladder SURGICAL PATHOLOGY Bjorn Betts MD 6/19/2022 1406        Implants: None      Drains: 10 mm Yeison-Rowland drain  Closed/Suction Drain Right RLQ Bulb (Active)       Findings: Gangrenous changes of the gallbladder a discolored cystic duct    Detailed Description of Procedure:   Patient was prepped and draped in umbilical port was blindly introduced to the abdomen position confirmed with laparoscope and then insufflation provided the epigastric and 2 subcostal ports were placed under direct vision the omentum was adhesed densely to the gallbladder it was dissected off bluntly to expose a gangrenous necrotic gallbladder the gallbladder was tense so it was aspirated through an incision made in the dome with the electrocautery once it was decompressed a grasper could then be used to graft the fundus of the gallbladder and retracted superiorly the angle of GISSELLE was 6 chronically and acutely inflamed as well and dissection was a bit tedious and difficult but eventually cystic artery and cystic duct were identified the cystic artery was clipped and divided the cystic duct was clipped near the gallbladder and incision was made in the cystic duct to attempt a intraoperative cholangiogram the taut catheter was used to introduce into the cystic duct and was thought to been performed under direct vision.   When instillation of contrast was done under fluoroscopic control it appeared that the vast majority contrast leaked into Morison's pouch revisualization of the area showed that the catheter was in the cystic duct attempt to advance it further was unsuccessful therefore the catheter was removed and the cystic duct was clipped doubly and transected gallbladder was dissected from the hepatic fossa with some difficulty due to the acute inflammatory changes using electrocautery once the gallbladder was detracts detached it was placed in an Endobag and brought out through the epigastric port wound the abdomen was irrigated aspirated hemostasis was obtained in the hepatic fossa a 10 mm Yeison-Rowland drain was placed within Morison's pouch and brought out through the right lateral port wound it was secured with a 2-0 nylon stitch and then the epigastric and umbilical port wounds were closed with 0 Vicryl fascial stitches followed by surgical staples to reapproximate the skin all needle and sponge counts were reported be correct at the end of the procedure  Electronically signed by Mihaela Pina MD on 6/19/2022 at 2:52 PM

## 2022-06-19 NOTE — H&P
Hospital Medicine History & Physical      PCP: No primary care provider on file. Date of Admission: 6/19/2022    Date of Service: Pt seen/examined on 6/19/2022  and Admitted to Inpatient with expected LOS greater than two midnights due to medical therapy. Chief Complaint:    Chief Complaint   Patient presents with    Abdominal Pain     Patient traiged using  #[id number], states abdominal pain in the pit of his stomach that started yesterday. Endoreses nausea, denies v/d. History Of Present Illness: The patient is a 28 y.o. male with no significant past medical history who presented with acute right upper quadrant and epigastric abdominal pain. Pain nonradiating. Worse with eating. Associated nausea and vomiting. He reports 1 episode of hematemesis. No fevers or chills. No prior similar episode. In the ER, CT abdomen pelvis was noted for distended gallbladder with adjacent haziness with diffuse intraluminal attenuation suggestive of sludge or stone. Past Medical History:   Reviewed. No past medical history on file. Past Surgical History:   Reviewed. No past surgical history on file. Medications Prior to Admission:    Not on any medications    Allergies: No known drug allergies    Social History:  The patient currently lives with family    TOBACCO:   reports that he has never smoked. He has never used smokeless tobacco.  ETOH:   reports no history of alcohol use. Family History:  Reviewed in detail and negative for DM, Early CAD, Cancer, CVA. Positive as follows:    No family history on file. REVIEW OF SYSTEMS:   Positive for epigastric abdominal pain and right upper quadrant abdominal pain, nausea vomiting, single episode of hematemesis since resolved and as noted in the HPI. All other systems reviewed and negative.     PHYSICAL EXAM:    BP (!) 153/92   Pulse 61   Temp 98.8 °F (37.1 °C) (Oral)   Resp 16   SpO2 97%     General appearance: No apparent distress appears stated age and cooperative. HEENT Normal cephalic, atraumatic without obvious deformity. Pupils equal, round, and reactive to light. Extra ocular muscles intact. Conjunctivae/corneas clear. Neck: Supple, No jugular venous distention/bruits. Lungs: Clear to auscultation, bilaterally without Rales/Wheezes/Rhonchi with good respiratory effort. Heart: Regular rate and rhythm with Normal S1/S2 without murmurs, rubs or gallops  Abdomen: Soft. Epigastric tenderness. Non-distended without rigidity or guarding and positive bowel sounds   Extremities: No clubbing, cyanosis, or edema bilaterally. Skin: Skin color, texture, turgor normal.  No rashes or lesions. Neurologic: Alert and oriented X 3, neurovascularly intact with sensory/motor intact upper extremities/lower extremities, bilaterally. Cranial nerves: II-XII intact, grossly non-focal.  Mental status: Alert, oriented, thought content appropriate. CBC   Recent Labs     06/19/22 0141   WBC 10.3   HGB 15.1   HCT 44.8         RENAL  Recent Labs     06/19/22 0141   *   K 3.8   CL 97*   CO2 29   BUN 13   CREATININE 1.0     LFT'S  Recent Labs     06/19/22 0141   *   *   BILITOT 2.5*   ALKPHOS 225*     COAG  No results for input(s): INR in the last 72 hours. CARDIAC ENZYMES  No results for input(s): CKTOTAL, CKMB, CKMBINDEX, TROPONINI in the last 72 hours.     U/A:    Lab Results   Component Value Date    COLORU Yellow 06/19/2022    CLARITYU Clear 06/19/2022    SPECGRAV 1.015 06/19/2022    LEUKOCYTESUR Negative 06/19/2022    BLOODU Negative 06/19/2022    GLUCOSEU Negative 06/19/2022       Active Hospital Problems    Diagnosis Date Noted    Acute cholecystitis [K81.0] 06/19/2022     Priority: Medium    Epigastric abdominal pain [R10.13] 06/19/2022     Priority: Medium    Nausea & vomiting [R11.2] 06/19/2022     Priority: Medium    Transaminitis [R74.01] 06/19/2022     Priority: Medium ASSESSMENT/PLAN:  60-year-old gentleman with no significant past medical history here with epigastric and right upper quadrant abdominal pain and associated nausea and vomiting found to have acute cholecystitis complicated by transaminitis    Plan:  - Continue IV antibiotics  - Continue IV hydration with NS  - Antiemetics  - Pain management  - General surgery consult  - GI consult  - Trend LFTs        DVT Prophylaxis: Subcut enoxaparin  Diet: Diet NPO  Code Status: Full code         Rm Wayne MD    Thank you No primary care provider on file. for the opportunity to be involved in this patient's care. If you have any questions or concerns please feel free to contact me at [phone].

## 2022-06-19 NOTE — ED PROVIDER NOTES
[address] [facility]        Pt Name: Debbie Lopez  MRN: [id number]  [place] 1990  Date of evaluation: 6/19/2022  Provider: JORDYN Dominguez  PCP: No primary care provider on file. Note Started: 2:33 AM EDT       DHARA. I have evaluated this patient. My supervising physician was available for consultation. CHIEF COMPLAINT       Chief Complaint   Patient presents with    Abdominal Pain     Patient traiged using  #[id number], states abdominal pain in the pit of his stomach that started yesterday. Endoreses nausea, denies v/d. HISTORY OF PRESENT ILLNESS   (Location, Timing/Onset, Context/Setting, Quality, Duration, Modifying Factors, Severity, Associated Signs and Symptoms)  Note limiting factors. Chief Complaint: Abd Pain     Debbie Lopez is a 28 y.o. male with no significant past medical history who presents to the ED with complaint of abdominal pain. Patient speaks primarily [address] and  was utilized for history and physical examination. Patient states he has had severe pain that he rates as a 10/10 to his upper abdomen has been ongoing for the past couple days. He states that been worsened over the past 24 hours. He states is worse when he eats. He states associated nausea and vomiting. Denies urinary symptoms or changes in bowel movements. Denies fever chills. Denies chest pain or shortness of breath. Denies any rashes or lesions. Denies any surgeries to his abdomen the past.  Denies history of similar symptoms in the past.  He denies any known history of GERD, gastritis, reflux or peptic ulcer disease. He denies significant alcohol use. Denies any drug use specifically marijuana. Denies significant NSAID usage. Denies taking any over-the-counter medication for symptom control. Nursing Notes were all reviewed and agreed with or any disagreements were addressed in the HPI.     REVIEW OF SYSTEMS (2-9 systems for level 4, 10 or more for level 5)     Review of Systems   Constitutional: Positive for appetite change. Negative for activity change, chills, diaphoresis, fatigue and fever. Respiratory: Negative. Negative for cough, chest tightness and shortness of breath. Cardiovascular: Negative. Negative for chest pain, palpitations and leg swelling. Gastrointestinal: Positive for abdominal pain, nausea and vomiting. Negative for constipation and diarrhea. Genitourinary: Negative for decreased urine volume, difficulty urinating, dysuria, flank pain, frequency, hematuria and urgency. Musculoskeletal: Negative for arthralgias, back pain, myalgias, neck pain and neck stiffness. Neurological: Negative for dizziness, light-headedness and headaches. Positives and Pertinent negatives as per HPI. Except as noted above in the ROS, all other systems were reviewed and negative. PAST MEDICAL HISTORY   No past medical history on file. SURGICAL HISTORY   No past surgical history on file. CURRENTMEDICATIONS       Previous Medications    No medications on file         ALLERGIES     Patient has no known allergies. FAMILYHISTORY     No family history on file. SOCIAL HISTORY       Social History     Tobacco Use    Smoking status: Never Smoker    Smokeless tobacco: Never Used   Substance Use Topics    Alcohol use: Never    Drug use: Never       SCREENINGS    Sandhya Coma Scale  Eye Opening: Spontaneous  Best Verbal Response: Oriented  Best Motor Response: Obeys commands  Sandhya Coma Scale Score: 15        PHYSICAL EXAM    (up to 7 for level 4, 8 or more for level 5)     ED Triage Vitals [06/19/22 0107]   BP Temp Temp Source Heart Rate Resp SpO2 Height Weight   (!) 158/97 98.8 °F (37.1 °C) Oral 76 20 98 % -- --       Physical Exam  Constitutional:       General: He is not in acute distress. Appearance: He is well-developed.  He is not ill-appearing, toxic-appearing or diaphoretic. HENT:      Head: Normocephalic and atraumatic. Right Ear: External ear normal.      Left Ear: External ear normal.   Eyes:      General:         Right eye: No discharge. Left eye: No discharge. Conjunctiva/sclera: Conjunctivae normal.   Cardiovascular:      Rate and Rhythm: Normal rate and regular rhythm. Pulses: Normal pulses. Heart sounds: Normal heart sounds. No murmur heard. No friction rub. No gallop. Comments: 2+ radial pulses bilaterally. No pedal edema. No calf tenderness. No JVD. Pulmonary:      Effort: Pulmonary effort is normal. No respiratory distress. Breath sounds: Normal breath sounds. No stridor. No wheezing, rhonchi or rales. Chest:      Chest wall: No tenderness. Abdominal:      General: Abdomen is flat. Bowel sounds are normal. There is no distension. Palpations: Abdomen is soft. There is no mass. Tenderness: There is abdominal tenderness in the right upper quadrant, epigastric area, periumbilical area and left upper quadrant. There is no right CVA tenderness, left CVA tenderness, guarding or rebound. Positive signs include Maki's sign. Negative signs include Rovsing's sign and McBurney's sign. Hernia: No hernia is present. Musculoskeletal:         General: Normal range of motion. Cervical back: Normal range of motion and neck supple. Skin:     General: Skin is warm and dry. Coloration: Skin is not pale. Findings: No erythema or rash. Neurological:      Mental Status: He is alert and oriented to person, place, and time.    Psychiatric:         Behavior: Behavior normal.         DIAGNOSTIC RESULTS   LABS:    Labs Reviewed   CBC WITH AUTO DIFFERENTIAL - Abnormal; Notable for the following components:       Result Value    Neutrophils Absolute 8.8 (*)     Lymphocytes Absolute 0.6 (*)     All other components within normal limits   COMPREHENSIVE METABOLIC PANEL W/ REFLEX TO MG FOR LOW K - Abnormal; Notable for the following components:    Sodium 135 (*)     Chloride 97 (*)     Glucose 157 (*)     Total Bilirubin 2.5 (*)     Alkaline Phosphatase 225 (*)      (*)      (*)     All other components within normal limits   URINALYSIS WITH REFLEX TO CULTURE - Abnormal; Notable for the following components:    Ketones, Urine 15 (*)     Urobilinogen, Urine 2.0 (*)     All other components within normal limits   LIPASE   HEPATITIS PANEL, ACUTE       When ordered only abnormal lab results are displayed. All other labs were within normal range or not returned as of this dictation. EKG: When ordered, EKG's are interpreted by the Emergency Department Physician in the absence of a cardiologist.  Please see their note for interpretation of EKG. RADIOLOGY:   Non-plain film images such as CT, Ultrasound and MRI are read by the radiologist. Plain radiographic images are visualized and preliminarily interpreted by the ED Provider with the below findings:        Interpretation per the Radiologist below, if available at the time of this note:    CT ABDOMEN PELVIS WO CONTRAST Additional Contrast? None   Final Result   1. The gallbladder is distended, with mild adjacent haziness noted. Recommend further evaluation with right upper quadrant ultrasound, as   cholecystitis is in the differential.  Diffuse intraluminal higher   attenuation could be related to sludge or stones. 2. Tiny punctate nonobstructive renal calculi, without ureteral calculi or   hydroureteronephrosis. 3. No acute inflammatory bowel process. CT ABDOMEN PELVIS WO CONTRAST Additional Contrast? None    Result Date: 6/19/2022  EXAMINATION: CT OF THE ABDOMEN AND PELVIS WITHOUT CONTRAST 6/19/2022 1:18 am TECHNIQUE: CT of the abdomen and pelvis was performed without the administration of intravenous contrast. Multiplanar reformatted images are provided for review.  Automated exposure control, iterative reconstruction, and/or weight based adjustment of the mA/kV was utilized to reduce the radiation dose to as low as reasonably achievable. COMPARISON: None. HISTORY: ORDERING SYSTEM PROVIDED HISTORY: abd pain TECHNOLOGIST PROVIDED HISTORY: Reason for exam:->abd pain Additional Contrast?->None Decision Support Exception - unselect if not a suspected or confirmed emergency medical condition->Emergency Medical Condition (MA) Reason for Exam: abd pain Relevant Medical/Surgical History: Abdominal Pain (Patient traiged using  #[id number], states abdominal pain in the pit of his stomach that started yesterday. Endoreses nausea, denies v/d.) FINDINGS: Lower Chest: No parenchymal consolidation or pleural effusion. No pericardial effusion. No distal esophageal thickening. Organs: No focal hypodense mass identified in the liver or spleen. No adrenal mass. No pancreatic hypodense mass. No peripancreatic inflammatory process. Gallbladder distension. Minimal haziness noted adjacent to the gallbladder. Increased density diffusely within the gallbladder is well. No discrete focal hyperattenuating stone. No obstructive urinary tract calculi. Tiny punctate nonobstructive nephrolithiasis. No ureteral calculi or hydroureteronephrosis. GI/Bowel: No ileus or obstruction. The appendix appears within normal limits for size without adjacent inflammatory change. A small appendicolith is noted. No ileus or obstruction. Pelvis: No bladder wall thickening or adjacent inflammatory change. No pelvic lymphadenopathy. Trace free fluid in the pelvis. Peritoneum/Retroperitoneum: No aortic aneurysm. No retroperitoneal or mesenteric lymphadenopathy. No bowel herniation. Bones/Soft Tissues: No acute subcutaneous soft tissue abnormality. No acute osseous abnormality is identified. 1. The gallbladder is distended, with mild adjacent haziness noted.  Recommend further evaluation with right upper quadrant ultrasound, as cholecystitis is in the differential. Diffuse intraluminal higher attenuation could be related to sludge or stones. 2. Tiny punctate nonobstructive renal calculi, without ureteral calculi or hydroureteronephrosis. 3. No acute inflammatory bowel process. PROCEDURES   Unless otherwise noted below, none     Procedures    CRITICAL CARE TIME       CONSULTS:  IP CONSULT TO GENERAL SURGERY      EMERGENCY DEPARTMENT COURSE and DIFFERENTIAL DIAGNOSIS/MDM:   Vitals:    Vitals:    06/19/22 0107   BP: (!) 158/97   Pulse: 76   Resp: 20   Temp: 98.8 °F (37.1 °C)   TempSrc: Oral   SpO2: 98%       Patient was given the following medications:  Medications   piperacillin-tazobactam (ZOSYN) 3,375 mg in dextrose 5 % 50 mL IVPB (mini-bag) (3,375 mg IntraVENous New Bag 6/19/22 0251)   ondansetron (ZOFRAN) injection 4 mg (4 mg IntraVENous Given 6/19/22 0142)   0.9 % sodium chloride bolus (0 mLs IntraVENous Stopped 6/19/22 0249)   pantoprazole (PROTONIX) injection 40 mg (40 mg IntraVENous Given 6/19/22 0144)   ketorolac (TORADOL) injection 30 mg (30 mg IntraVENous Given 6/19/22 0142)   aluminum & magnesium hydroxide-simethicone (MAALOX) 30 mL, lidocaine viscous hcl (XYLOCAINE) 5 mL (GI COCKTAIL) ( Oral Given 6/19/22 0147)   morphine injection 4 mg (4 mg IntraVENous Given 6/19/22 0248)   ondansetron (ZOFRAN) injection 4 mg (4 mg IntraVENous Given 6/19/22 0248)         Is this patient to be included in the SEP-1 Core Measure due to severe sepsis or septic shock? No   Exclusion criteria - the patient is NOT to be included for SEP-1 Core Measure due to:  2+ SIRS criteria are not met    Patient is a 58-year-old male who presents to the ED with complaint of abdominal pain. Complaint of upper abdominal pain that he states is been ongoing for the past 48 hours. Worse over the past 24 hours. He states that she had nausea and vomiting. Afebrile stable vital signs. He does have some tender palpation of the epigastric abdomen right upper quadrant.   IV was established and blood work obtained. He was given Protonix, fluids, Zofran, Toradol and GI cocktail here in the ED. Upon repeat evaluation he states he is not feeling any better despite medication here in the ED. Given dose of morphine and Zofran. CBC showed normal white count, hemoglobin and platelets. CMP showed bilirubin of 2.5 with  and AST of 354. Lipase was normal.  Urinalysis showed 15 ketones but otherwise unremarkable. CT of the abdomen pelvis showed distended gallbladder with adjacent haziness. Concern for acute cholecystitis given patient's presentation and CT read. There does appear to be higher attenuation which could be related to sludge/stones per radiology read. Given history and physical examination concern for acute cholecystitis given upper abdominal pain with CT read and elevated bilirubin/LFTs. Case discussed with general surgeon who recommended admission to hospitalist service for further evaluation and treatment. Patient was ordered pain medication, nausea medication and fluids here in the ED. Remained NPO. Ordered Zosyn for potential acute cholecystitis. Case was discussed with hospitalist for admission. FINAL IMPRESSION      1. Acute cholecystitis          DISPOSITION/PLAN   DISPOSITION Decision To Admit 06/19/2022 02:26:47 AM      PATIENT REFERRED TO:  No follow-up provider specified.     DISCHARGE MEDICATIONS:  New Prescriptions    No medications on file       DISCONTINUED MEDICATIONS:  Discontinued Medications    No medications on file              (Please note that portions of this note were completed with a voice recognition program.  Efforts were made to edit the dictations but occasionally words are mis-transcribed.)    JORDYN Orozco (electronically signed)          JORDYN Madden  06/19/22 1934

## 2022-06-19 NOTE — ANESTHESIA PRE PROCEDURE
Department of Anesthesiology  Preprocedure Note       Name:  Preethi Ash   Age:  28 y.o.  :  1990                                          MRN:  [id number]         Date:  2022      Surgeon: Gerald Coello):  Jerardo Yepez MD    Procedure: Procedure(s):  CHOLECYSTECTOMY LAPAROSCOPIC, CHOLANGIOGRAM    Medications prior to admission:   Prior to Admission medications    Not on File       Current medications:    Current Facility-Administered Medications   Medication Dose Route Frequency Provider Last Rate Last Admin    sodium chloride flush 0.9 % injection 5-40 mL  5-40 mL IntraVENous 2 times per day Lakhwinder Holm MD   10 mL at 22 1014    sodium chloride flush 0.9 % injection 5-40 mL  5-40 mL IntraVENous PRN Freida Spring MD        0.9 % sodium chloride infusion   IntraVENous PRN Freida Spring MD        enoxaparin (LOVENOX) injection 40 mg  40 mg SubCUTAneous Daily Freida Spring MD   40 mg at 22 0822    ondansetron (ZOFRAN-ODT) disintegrating tablet 4 mg  4 mg Oral Q8H PRN Freida Spring MD        Or    ondansetron (ZOFRAN) injection 4 mg  4 mg IntraVENous Q6H PRN Freida Spring MD        polyethylene glycol (GLYCOLAX) packet 17 g  17 g Oral Daily PRN Freida Spring MD        acetaminophen (TYLENOL) tablet 650 mg  650 mg Oral Q6H PRN Freida Spring MD        Or    acetaminophen (TYLENOL) suppository 650 mg  650 mg Rectal Q6H PRN Freida Spring MD        morphine (PF) injection 2 mg  2 mg IntraVENous Q2H PRN Freida Spring MD        Or    morphine injection 4 mg  4 mg IntraVENous Q2H PRN Freida Spring MD   4 mg at 22 0821    HYDROcodone-acetaminophen (NORCO) 5-325 MG per tablet 1 tablet  1 tablet Oral Q6H PRN Freida Spring MD        piperacillin-tazobactam (ZOSYN) 3,375 mg in dextrose 5 % 50 mL IVPB extended infusion (mini-bag)  3,375 mg IntraVENous Q8H Freida Spring MD        0.9 % sodium chloride infusion IntraVENous Continuous Freida Spring  mL/hr at 06/19/22 0614 New Bag at 06/19/22 8402       Allergies:  No Known Allergies    Problem List:    Patient Active Problem List   Diagnosis Code    Acute cholecystitis K81.0    Epigastric abdominal pain R10.13    Nausea & vomiting R11.2    Transaminitis R74.01       Past Medical History:  No past medical history on file. Past Surgical History:  No past surgical history on file. Social History:    Social History     Tobacco Use    Smoking status: Never Smoker    Smokeless tobacco: Never Used   Substance Use Topics    Alcohol use: Never                                Counseling given: Not Answered      Vital Signs (Current):   Vitals:    06/19/22 0608 06/19/22 0638 06/19/22 0648 06/19/22 0815   BP: (!) 155/82   128/78   Pulse: 77   86   Resp: 16 16  16   Temp: 97.8 °F (36.6 °C)   98.9 °F (37.2 °C)   TempSrc:    Oral   SpO2: 99%   98%   Weight:   200 lb (90.7 kg)    Height:   5' 4\" (1.626 m)                                               BP Readings from Last 3 Encounters:   06/19/22 128/78       NPO Status:                                                                                 BMI:   Wt Readings from Last 3 Encounters:   06/19/22 200 lb (90.7 kg)     Body mass index is 34.33 kg/m².     CBC:   Lab Results   Component Value Date    WBC 10.3 06/19/2022    RBC 4.89 06/19/2022    HGB 15.1 06/19/2022    HCT 44.8 06/19/2022    MCV 91.6 06/19/2022    RDW 13.1 06/19/2022     06/19/2022       CMP:   Lab Results   Component Value Date     06/19/2022    K 3.8 06/19/2022    CL 97 06/19/2022    CO2 29 06/19/2022    BUN 13 06/19/2022    CREATININE 1.0 06/19/2022    GFRAA >60 06/19/2022    AGRATIO 1.3 06/19/2022    LABGLOM >60 06/19/2022    GLUCOSE 157 06/19/2022    PROT 7.8 06/19/2022    CALCIUM 9.4 06/19/2022    BILITOT 2.5 06/19/2022    ALKPHOS 225 06/19/2022     06/19/2022     06/19/2022       POC Tests: No results for input(s): POCGLU, POCNA, POCK, POCCL, POCBUN, POCHEMO, POCHCT in the last 72 hours. Coags: No results found for: PROTIME, INR, APTT    HCG (If Applicable): No results found for: PREGTESTUR, PREGSERUM, HCG, HCGQUANT     ABGs: No results found for: PHART, PO2ART, UGU6MBW, SNC0GPX, BEART, T1AFOWGF     Type & Screen (If Applicable):  No results found for: LABABO, LABRH    Drug/Infectious Status (If Applicable):  No results found for: HIV, HEPCAB    COVID-19 Screening (If Applicable): No results found for: COVID19        Anesthesia Evaluation  Patient summary reviewed and Nursing notes reviewed  Airway: Mallampati: II  TM distance: >3 FB   Neck ROM: full  Mouth opening: > = 3 FB   Dental:          Pulmonary:                              Cardiovascular:  Exercise tolerance: good (>4 METS),                     Neuro/Psych:               GI/Hepatic/Renal:             Endo/Other:                     Abdominal:             Vascular: Other Findings:           Anesthesia Plan      general     ASA 1 - emergent       Induction: intravenous and rapid sequence. MIPS: Postoperative opioids intended, Prophylactic antiemetics administered and Postoperative trial extubation. Anesthetic plan and risks discussed with patient.               Post-op pain plan if not by surgeon: single peripheral nerve block            Fredrick Izaguirre MD   6/19/2022

## 2022-06-20 ENCOUNTER — APPOINTMENT (OUTPATIENT)
Dept: GENERAL RADIOLOGY | Age: 32
DRG: 418 | End: 2022-06-20
Payer: COMMERCIAL

## 2022-06-20 LAB
A/G RATIO: 1.1 (ref 1.1–2.2)
ALBUMIN SERPL-MCNC: 3.5 G/DL (ref 3.4–5)
ALP BLD-CCNC: 174 U/L (ref 40–129)
ALT SERPL-CCNC: 392 U/L (ref 10–40)
ANION GAP SERPL CALCULATED.3IONS-SCNC: 8 MMOL/L (ref 3–16)
AST SERPL-CCNC: 200 U/L (ref 15–37)
BASOPHILS ABSOLUTE: 0 K/UL (ref 0–0.2)
BASOPHILS RELATIVE PERCENT: 0.3 %
BILIRUB SERPL-MCNC: 1.3 MG/DL (ref 0–1)
BUN BLDV-MCNC: 13 MG/DL (ref 7–20)
CALCIUM SERPL-MCNC: 8.8 MG/DL (ref 8.3–10.6)
CHLORIDE BLD-SCNC: 103 MMOL/L (ref 99–110)
CO2: 29 MMOL/L (ref 21–32)
CREAT SERPL-MCNC: 1 MG/DL (ref 0.9–1.3)
EOSINOPHILS ABSOLUTE: 0 K/UL (ref 0–0.6)
EOSINOPHILS RELATIVE PERCENT: 0 %
GFR AFRICAN AMERICAN: >60
GFR NON-AFRICAN AMERICAN: >60
GLUCOSE BLD-MCNC: 124 MG/DL (ref 70–99)
HCT VFR BLD CALC: 41.4 % (ref 40.5–52.5)
HEMOGLOBIN: 13.6 G/DL (ref 13.5–17.5)
LYMPHOCYTES ABSOLUTE: 1 K/UL (ref 1–5.1)
LYMPHOCYTES RELATIVE PERCENT: 6.4 %
MCH RBC QN AUTO: 30.7 PG (ref 26–34)
MCHC RBC AUTO-ENTMCNC: 32.9 G/DL (ref 31–36)
MCV RBC AUTO: 93.2 FL (ref 80–100)
MONOCYTES ABSOLUTE: 1.3 K/UL (ref 0–1.3)
MONOCYTES RELATIVE PERCENT: 8.2 %
NEUTROPHILS ABSOLUTE: 13 K/UL (ref 1.7–7.7)
NEUTROPHILS RELATIVE PERCENT: 85.1 %
PDW BLD-RTO: 13.5 % (ref 12.4–15.4)
PLATELET # BLD: 254 K/UL (ref 135–450)
PMV BLD AUTO: 7.6 FL (ref 5–10.5)
POTASSIUM REFLEX MAGNESIUM: 4.3 MMOL/L (ref 3.5–5.1)
RBC # BLD: 4.44 M/UL (ref 4.2–5.9)
SODIUM BLD-SCNC: 140 MMOL/L (ref 136–145)
TOTAL PROTEIN: 6.7 G/DL (ref 6.4–8.2)
WBC # BLD: 15.3 K/UL (ref 4–11)

## 2022-06-20 PROCEDURE — 71045 X-RAY EXAM CHEST 1 VIEW: CPT

## 2022-06-20 PROCEDURE — C9113 INJ PANTOPRAZOLE SODIUM, VIA: HCPCS | Performed by: SPECIALIST

## 2022-06-20 PROCEDURE — 85025 COMPLETE CBC W/AUTO DIFF WBC: CPT

## 2022-06-20 PROCEDURE — 99024 POSTOP FOLLOW-UP VISIT: CPT | Performed by: SPECIALIST

## 2022-06-20 PROCEDURE — 6360000002 HC RX W HCPCS: Performed by: INTERNAL MEDICINE

## 2022-06-20 PROCEDURE — 80053 COMPREHEN METABOLIC PANEL: CPT

## 2022-06-20 PROCEDURE — 6360000002 HC RX W HCPCS: Performed by: SPECIALIST

## 2022-06-20 PROCEDURE — 2580000003 HC RX 258: Performed by: INTERNAL MEDICINE

## 2022-06-20 PROCEDURE — APPNB30 APP NON BILLABLE TIME 0-30 MINS: Performed by: NURSE PRACTITIONER

## 2022-06-20 PROCEDURE — 1200000000 HC SEMI PRIVATE

## 2022-06-20 PROCEDURE — 36415 COLL VENOUS BLD VENIPUNCTURE: CPT

## 2022-06-20 PROCEDURE — 94760 N-INVAS EAR/PLS OXIMETRY 1: CPT

## 2022-06-20 RX ORDER — OXYCODONE HYDROCHLORIDE AND ACETAMINOPHEN 5; 325 MG/1; MG/1
1 TABLET ORAL EVERY 6 HOURS PRN
Qty: 10 TABLET | Refills: 0 | Status: SHIPPED | OUTPATIENT
Start: 2022-06-20 | End: 2022-06-25

## 2022-06-20 RX ORDER — MORPHINE SULFATE 2 MG/ML
2 INJECTION, SOLUTION INTRAMUSCULAR; INTRAVENOUS EVERY 4 HOURS PRN
Status: DISCONTINUED | OUTPATIENT
Start: 2022-06-20 | End: 2022-06-21 | Stop reason: HOSPADM

## 2022-06-20 RX ORDER — CIPROFLOXACIN 500 MG/1
500 TABLET, FILM COATED ORAL 2 TIMES DAILY
Qty: 14 TABLET | Refills: 0 | Status: SHIPPED | OUTPATIENT
Start: 2022-06-20 | End: 2022-06-27

## 2022-06-20 RX ADMIN — PANTOPRAZOLE SODIUM 40 MG: 40 INJECTION, POWDER, LYOPHILIZED, FOR SOLUTION INTRAVENOUS at 10:12

## 2022-06-20 RX ADMIN — ENOXAPARIN SODIUM 40 MG: 100 INJECTION SUBCUTANEOUS at 10:13

## 2022-06-20 RX ADMIN — PIPERACILLIN AND TAZOBACTAM 3375 MG: 3; .375 INJECTION, POWDER, LYOPHILIZED, FOR SOLUTION INTRAVENOUS at 02:52

## 2022-06-20 RX ADMIN — Medication 10 ML: at 10:12

## 2022-06-20 RX ADMIN — PIPERACILLIN AND TAZOBACTAM 3375 MG: 3; .375 INJECTION, POWDER, LYOPHILIZED, FOR SOLUTION INTRAVENOUS at 19:54

## 2022-06-20 RX ADMIN — PIPERACILLIN AND TAZOBACTAM 3375 MG: 3; .375 INJECTION, POWDER, LYOPHILIZED, FOR SOLUTION INTRAVENOUS at 12:07

## 2022-06-20 NOTE — PROGRESS NOTES
Gastroenterology Progress Note    David Coughlin is a 28 y.o. male patient. Principal Problem:    Acute cholecystitis  Active Problems:    Epigastric abdominal pain    Nausea & vomiting    Transaminitis  Resolved Problems:    * No resolved hospital problems. *      SUBJECTIVE:  Abdominal pain much improved. Tolerating PO intake. ROS:  No fever, chills  No chest pain, palpitations  No SOB, cough  Gastrointestinal ROS: see above    Physical    VITALS:  /81   Pulse 83   Temp 98.6 °F (37 °C) (Oral)   Resp 16   Ht 5' 4\" (1.626 m)   Wt 200 lb (90.7 kg)   SpO2 96%   BMI 34.33 kg/m²   TEMPERATURE:  Current - Temp: 98.6 °F (37 °C); Max - Temp  Av.2 °F (36.8 °C)  Min: 97.3 °F (36.3 °C)  Max: 98.8 °F (37.1 °C)    Abdomen soft, ND, NT,  Bowel sounds normal. Lap incisions CDI    Data    Data Review:    Recent Labs     22  0141 22  0452   WBC 10.3 15.3*   HGB 15.1 13.6   HCT 44.8 41.4   MCV 91.6 93.2    254     Recent Labs     22  0141 22  0837 22  0452   * 136 140   K 3.8 3.8 4.3   CL 97* 98* 103   CO2 29 27 29   BUN 13 11 13   CREATININE 1.0 0.8* 1.0     Recent Labs     22  0141 22  0837 22  0452   * 493* 200*   * 514* 392*   BILITOT 2.5* 4.1* 1.3*   ALKPHOS 225* 238* 174*     Recent Labs     22  0141   LIPASE 21.0     No results for input(s): PROTIME, INR in the last 72 hours. No results for input(s): PTT in the last 72 hours. ASSESSMENT :   S/P Lap nette for gangrenous GB: 22 IOC unable to be performed, noted to have elevated LFT post op. No CBD dilation on initial imaging. LFT trending down. Alkphos 174. Abdominal pain much improved. Low suspicion for obstructed cbd at this time    PLAN :  - Follow LFT ensure trend continues downward  - Repeat LFT and f/u in office in 1 month.  If persistently elevated consider MRCP at this time    Discussed with Dr. Jaclyn Condon, [facility] have personally performed a face to face diagnostic evaluation on this patient. I have interviewed and examined the patient and I agree with the findings and recommended plan of care. In summary, my findings and plan are the following:  Has some soreness in ruq but overall feels much better. Mild ttp in ruq. LFT improving and no evidence of dilated cbd so may all have been from cholecystitis. Cont abx and follow lft. No plan for MRCP at this time.        Valerie Marie MD  [address]

## 2022-06-20 NOTE — PLAN OF CARE
Problem: Discharge Planning  Goal: Discharge to home or other facility with appropriate resources  6/19/2022 2238 by Lawanda Morgan RN  Outcome: Progressing  6/19/2022 1014 by Claudia Comer RN  Outcome: Progressing  Flowsheets (Taken 6/19/2022 0815)  Discharge to home or other facility with appropriate resources:   Identify barriers to discharge with patient and caregiver   Refer to discharge planning if patient needs post-hospital services based on physician order or complex needs related to functional status, cognitive ability or social support system     Problem: Pain  Goal: Verbalizes/displays adequate comfort level or baseline comfort level  6/19/2022 2238 by Lawanda Morgan RN  Outcome: Progressing  6/19/2022 1014 by Claudia Comer RN  Outcome: Progressing     Problem: ABCDS Injury Assessment  Goal: Absence of physical injury  6/19/2022 2238 by Lawanda Morgan RN  Outcome: Progressing  Flowsheets (Taken 6/19/2022 2236)  Absence of Physical Injury: Implement safety measures based on patient assessment  6/19/2022 1014 by Claudia Comer RN  Outcome: Progressing

## 2022-06-20 NOTE — PROGRESS NOTES
Shift assessment and AM vitals complete, VSS. AM meds given. Pt doing well, GI signed off this AM. Surgery says possible d/c tmrw, watching bilirubin level. Chest xray ordered r/t SOB this AM, results pending. The care plan and education has been reviewed and mutually agreed upon with the patient.

## 2022-06-20 NOTE — CARE COORDINATION
Discharge Planning. Patient admitted with an anticipated short hospitalization length of stay. Chart reviewed and it appears that patient has minimal needs for discharge at this time. Discussed with patients nurse and requested that case management be notified if discharge needs are identified. Case management will continue to follow progress and update discharge plan as needed.       Electronically signed by JENNY Ho on 6/20/2022 at 8:07 AM

## 2022-06-20 NOTE — PROGRESS NOTES
[facility]    6/20/2022 11:47 AM        Name: Fredrick Rivera . Admitted: 6/19/2022  Primary Care Provider: No primary care provider on file. (Tel: None)      Subjective:  . Patient feeling better. States he feels like he is having a hard time taking a deep breath/feels sob. Pain overall much improved from yesterday. Reviewed interval ancillary notes    Current Medications  morphine (PF) injection 2 mg, Q4H PRN  sodium chloride flush 0.9 % injection 5-40 mL, 2 times per day  sodium chloride flush 0.9 % injection 5-40 mL, PRN  0.9 % sodium chloride infusion, PRN  enoxaparin (LOVENOX) injection 40 mg, Daily  polyethylene glycol (GLYCOLAX) packet 17 g, Daily PRN  acetaminophen (TYLENOL) tablet 650 mg, Q6H PRN   Or  acetaminophen (TYLENOL) suppository 650 mg, Q6H PRN  piperacillin-tazobactam (ZOSYN) 3,375 mg in dextrose 5 % 50 mL IVPB extended infusion (mini-bag), Q8H  0.9 % sodium chloride infusion, Continuous  oxyCODONE-acetaminophen (PERCOCET) 5-325 MG per tablet 1 tablet, Q4H PRN  ondansetron (ZOFRAN) injection 4 mg, Q6H PRN  gentamicin (GARAMYCIN) 240 mg in dextrose 5 % 100 mL IVPB, Once  pantoprazole (PROTONIX) injection 40 mg, Daily        Objective:  /62   Pulse 98   Temp 98.7 °F (37.1 °C) (Oral)   Resp 16   Ht 5' 4\" (1.626 m)   Wt 200 lb (90.7 kg)   SpO2 98%   BMI 34.33 kg/m²     Intake/Output Summary (Last 24 hours) at 6/20/2022 1147  Last data filed at 6/20/2022 0949  Gross per 24 hour   Intake 700 ml   Output 125 ml   Net 575 ml      Wt Readings from Last 3 Encounters:   06/19/22 200 lb (90.7 kg)       General appearance:  Appears comfortable  Eyes: Sclera clear. Pupils equal.  ENT: Moist oral mucosa. Trachea midline, no adenopathy. Cardiovascular: Regular rhythm, normal S1, S2. No murmur. No edema in lower extremities  Respiratory: Not using accessory muscles.  Good inspiratory effort. Clear to auscultation bilaterally, no wheeze or crackles. GI: Abdomen soft, no tenderness, not distended, normal bowel sounds  Musculoskeletal: No cyanosis in digits, neck supple  Neurology: CN 2-12 grossly intact. No speech or motor deficits  Psych: Normal affect. Alert and oriented in time, place and person  Skin: Warm, dry, normal turgor    Labs and Tests:  CBC:   Recent Labs     06/19/22  0141 06/20/22  0452   WBC 10.3 15.3*   HGB 15.1 13.6    254     BMP:    Recent Labs     06/19/22  0141 06/19/22  0837 06/20/22  0452   * 136 140   K 3.8 3.8 4.3   CL 97* 98* 103   CO2 29 27 29   BUN 13 11 13   CREATININE 1.0 0.8* 1.0   GLUCOSE 157* 136* 124*     Hepatic:   Recent Labs     06/19/22  0141 06/19/22  0837 06/20/22  0452   * 493* 200*   * 514* 392*   BILITOT 2.5* 4.1* 1.3*   ALKPHOS 225* 238* 174*     CT abd/pelvis  1. The gallbladder is distended, with mild adjacent haziness noted. Recommend further evaluation with right upper quadrant ultrasound, as   cholecystitis is in the differential.  Diffuse intraluminal higher   attenuation could be related to sludge or stones. 2. Tiny punctate nonobstructive renal calculi, without ureteral calculi or   hydroureteronephrosis. 3. No acute inflammatory bowel process. Problem List  Principal Problem:    Acute cholecystitis  Active Problems:    Epigastric abdominal pain    Nausea & vomiting    Transaminitis  Resolved Problems:    * No resolved hospital problems. *       Assessment & Plan:   1. Acute cholecystitis-PO day 1 from lap nette IOC could not be obtained however LFTs improving. Per surgery will need one week of levaquin at dc-plan for dc tomorrow. 2. SOB-will check CXR although I suspect this may just be from pain. Sats ok. Does have WBC of 15 although likely just reactive. Diet: ADULT DIET;  Regular  Code:Full Code  DVT PPX: gaby Alejandro PA-C   6/20/2022 11:47 AM

## 2022-06-20 NOTE — PROGRESS NOTES
Postoperative day #1 status post laparoscopic cholecystectomy and attempted cholangiogram for gangrenous cholecystitis  Patient feels much better today complaining of only mild incisional discomfort he is tolerating clear liquids his diet will be shortly advanced    Vital signs are stable he is afebrile  Chest clear to auscultation percussion  Cardiovascular regular rate and rhythm  Abdomen is soft without tenderness incisions are clean patient has a moderate amount of sanguinous drainage in the Yeison-Rowland drain also's appears to be a touch of bile as well. Extremities without calf tenderness  Neurologically without focal findings    Laboratories total bilirubin has fallen from 4 preoperatively to 1 today    Assessment and plan this is a patient who had gangrenous cholecystitis while this could explain the bilirubin preoperatively it would have been nice to obtain a cholangiogram to definitively know whether there was stone present. However his bilirubin has decreased as 1 would expect for someone whose elevation was based upon any infectious process however he still has considerable amount of drainage from the Yeison-Rowland drain and there is a touch of bile within that drainage which could be explained by spillage during the operative procedure but I think it behooves us to continue to observe observe this output as we advance his diet and continue his broad-spectrum antibiotics. If his bilirubin continues to trend downward as well as the drainage I think the patient can be discharged tomorrow with outpatient follow-up he will need a course of either Cipro or the Levaquin for approximately 7 days post discharge.   It is also my hope that the Yeison-Rowland drain can be discontinued tomorrow prior to his discharge

## 2022-06-21 VITALS
HEIGHT: 64 IN | SYSTOLIC BLOOD PRESSURE: 139 MMHG | RESPIRATION RATE: 17 BRPM | OXYGEN SATURATION: 98 % | BODY MASS INDEX: 34.15 KG/M2 | HEART RATE: 86 BPM | WEIGHT: 200 LBS | DIASTOLIC BLOOD PRESSURE: 85 MMHG | TEMPERATURE: 98 F

## 2022-06-21 LAB
A/G RATIO: 0.9 (ref 1.1–2.2)
ALBUMIN SERPL-MCNC: 3.3 G/DL (ref 3.4–5)
ALP BLD-CCNC: 138 U/L (ref 40–129)
ALT SERPL-CCNC: 230 U/L (ref 10–40)
ANION GAP SERPL CALCULATED.3IONS-SCNC: 7 MMOL/L (ref 3–16)
AST SERPL-CCNC: 82 U/L (ref 15–37)
BASOPHILS ABSOLUTE: 0 K/UL (ref 0–0.2)
BASOPHILS RELATIVE PERCENT: 0.2 %
BILIRUB SERPL-MCNC: 0.7 MG/DL (ref 0–1)
BUN BLDV-MCNC: 12 MG/DL (ref 7–20)
CALCIUM SERPL-MCNC: 9 MG/DL (ref 8.3–10.6)
CHLORIDE BLD-SCNC: 101 MMOL/L (ref 99–110)
CO2: 32 MMOL/L (ref 21–32)
CREAT SERPL-MCNC: 1 MG/DL (ref 0.9–1.3)
EOSINOPHILS ABSOLUTE: 0.1 K/UL (ref 0–0.6)
EOSINOPHILS RELATIVE PERCENT: 0.9 %
GFR AFRICAN AMERICAN: >60
GFR NON-AFRICAN AMERICAN: >60
GLUCOSE BLD-MCNC: 112 MG/DL (ref 70–99)
HCT VFR BLD CALC: 40.7 % (ref 40.5–52.5)
HEMOGLOBIN: 13.4 G/DL (ref 13.5–17.5)
LYMPHOCYTES ABSOLUTE: 1.5 K/UL (ref 1–5.1)
LYMPHOCYTES RELATIVE PERCENT: 15.9 %
MCH RBC QN AUTO: 30.4 PG (ref 26–34)
MCHC RBC AUTO-ENTMCNC: 33 G/DL (ref 31–36)
MCV RBC AUTO: 92 FL (ref 80–100)
MONOCYTES ABSOLUTE: 1.1 K/UL (ref 0–1.3)
MONOCYTES RELATIVE PERCENT: 11 %
NEUTROPHILS ABSOLUTE: 7 K/UL (ref 1.7–7.7)
NEUTROPHILS RELATIVE PERCENT: 72 %
PDW BLD-RTO: 13.3 % (ref 12.4–15.4)
PLATELET # BLD: 263 K/UL (ref 135–450)
PMV BLD AUTO: 7.4 FL (ref 5–10.5)
POTASSIUM REFLEX MAGNESIUM: 3.8 MMOL/L (ref 3.5–5.1)
RBC # BLD: 4.42 M/UL (ref 4.2–5.9)
SODIUM BLD-SCNC: 140 MMOL/L (ref 136–145)
TOTAL PROTEIN: 6.8 G/DL (ref 6.4–8.2)
WBC # BLD: 9.7 K/UL (ref 4–11)

## 2022-06-21 PROCEDURE — APPNB30 APP NON BILLABLE TIME 0-30 MINS: Performed by: NURSE PRACTITIONER

## 2022-06-21 PROCEDURE — C9113 INJ PANTOPRAZOLE SODIUM, VIA: HCPCS | Performed by: SPECIALIST

## 2022-06-21 PROCEDURE — 6360000002 HC RX W HCPCS: Performed by: INTERNAL MEDICINE

## 2022-06-21 PROCEDURE — 85025 COMPLETE CBC W/AUTO DIFF WBC: CPT

## 2022-06-21 PROCEDURE — 36415 COLL VENOUS BLD VENIPUNCTURE: CPT

## 2022-06-21 PROCEDURE — 6360000002 HC RX W HCPCS: Performed by: SPECIALIST

## 2022-06-21 PROCEDURE — 2580000003 HC RX 258: Performed by: INTERNAL MEDICINE

## 2022-06-21 PROCEDURE — 99024 POSTOP FOLLOW-UP VISIT: CPT | Performed by: SPECIALIST

## 2022-06-21 PROCEDURE — 80053 COMPREHEN METABOLIC PANEL: CPT

## 2022-06-21 RX ADMIN — PANTOPRAZOLE SODIUM 40 MG: 40 INJECTION, POWDER, LYOPHILIZED, FOR SOLUTION INTRAVENOUS at 08:39

## 2022-06-21 RX ADMIN — PIPERACILLIN AND TAZOBACTAM 3375 MG: 3; .375 INJECTION, POWDER, LYOPHILIZED, FOR SOLUTION INTRAVENOUS at 03:00

## 2022-06-21 RX ADMIN — Medication 10 ML: at 08:31

## 2022-06-21 RX ADMIN — ENOXAPARIN SODIUM 40 MG: 100 INJECTION SUBCUTANEOUS at 08:39

## 2022-06-21 RX ADMIN — PIPERACILLIN AND TAZOBACTAM 3375 MG: 3; .375 INJECTION, POWDER, LYOPHILIZED, FOR SOLUTION INTRAVENOUS at 11:47

## 2022-06-21 NOTE — PROGRESS NOTES
Shift assessment completed, VSS. PM meds given. Pt doing well, GI signed off this AM. Possible d/c tomorrow, watching bilirubin level. Chest xray ordered r/t SOB yesterday, result came back negative. Voiding, clear, skip color output. JOHANNY in place draining small output. The care plan and education has been reviewed and mutually agreed upon with the patient.

## 2022-06-21 NOTE — DISCHARGE SUMMARY
[facility] [facility] [place] DISCHARGE SUMMARY    Patient Demographics    Patient. Rianna Mann  Date of Birth. 1990  MRN. [id number]     Primary care provider. No primary care provider on file. (Tel: None)    Admit date: 6/19/2022    Discharge date 6/21/2022  Note Date: 6/21/2022     Reason for Hospitalization. Chief Complaint   Patient presents with    Abdominal Pain     Patient traiged using  #[id number], states abdominal pain in the pit of his stomach that started yesterday. Endoreses nausea, denies v/d. Significant Findings. Principal Problem:    Acute cholecystitis  Active Problems:    Epigastric abdominal pain    Nausea & vomiting    Transaminitis  Resolved Problems:    * No resolved hospital problems. *       Problems and results from this hospitalization that need follow up. Get LFT's in 1 month  Follow up with general surgery     Significant test results and incidental findings. CT abd/pelvis  1. The gallbladder is distended, with mild adjacent haziness noted. Recommend further evaluation with right upper quadrant ultrasound, as   cholecystitis is in the differential.  Diffuse intraluminal higher   attenuation could be related to sludge or stones. 2. Tiny punctate nonobstructive renal calculi, without ureteral calculi or   hydroureteronephrosis. 3. No acute inflammatory bowel process. Invasive procedures and treatments. 6/19/22  CHOLECYSTECTOMY LAPAROSCOPIC, ATTEMPTED CHOLANGIOGRAM     [facility] Course. The patient was admitted through the ED with abdominal pain . He was admitted and followed by GI and General surgery. He was taken to the OR on 6/19/22 for lap nette with attempted Cholangiogram  Post operatively his pain was controlled and he was tolerating a diet and walking in the room on the day of d/c.      He had elevated LFT's however they were trending down during his stay. He needs follow up LFT's in 1 month. If elevated then consider MRCP     Consults. IP CONSULT TO GENERAL SURGERY  IP CONSULT TO GENERAL SURGERY  IP CONSULT TO GI    Physical examination on discharge day. /85   Pulse 86   Temp 98 °F (36.7 °C) (Oral)   Resp 17   Ht 5' 4\" (1.626 m)   Wt 200 lb (90.7 kg)   SpO2 98%   BMI 34.33 kg/m²   General appearance. Alert. Looks comfortable. HEENT. Sclera clear. Moist mucus membranes. Cardiovascular. Regular rate and rhythm, normal S1, S2. No murmur. Respiratory. Not using accessory muscles. Clear to auscultation bilaterally, no wheeze. Gastrointestinal. Abdomen soft with bowel sounds noted, operative incisions are unremarkable. Neurology. Facial symmetry. No speech deficits. Moving all extremities equally. Extremities. No edema in lower extremities. Skin. Warm, dry, normal turgor    Condition at time of discharge stable     Medication instructions provided to patient at discharge. Medication List      START taking these medications    ciprofloxacin 500 MG tablet  Commonly known as: CIPRO  Take 1 tablet by mouth 2 times daily for 7 days     oxyCODONE-acetaminophen 5-325 MG per tablet  Commonly known as: Percocet  Take 1 tablet by mouth every 6 hours as needed for Pain for up to 5 days. Take lowest dose possible to manage pain; may stop taking sooner than 7 days if pain is able to be controlled with non-narcotic analgesics and therapies. Where to Get Your Medications      You can get these medications from any pharmacy    Bring a paper prescription for each of these medications  · ciprofloxacin 500 MG tablet  · oxyCODONE-acetaminophen 5-325 MG per tablet         Discharge recommendations given to patient. Follow Up. in 1 week   Disposition. Home   Activity. As tolerated   Diet: ADULT DIET; Regular      Spent 35 minutes in discharge process.     Signed:  FRANTZ Cintron CNP     6/21/2022 8:00 AM

## 2022-06-21 NOTE — PLAN OF CARE
Problem: Discharge Planning  Goal: Discharge to home or other facility with appropriate resources  6/21/2022 0947 by Gigi Rendon RN  Outcome: Progressing  6/21/2022 0228 by Anmlo Baeza RN  Outcome: Progressing     Problem: Pain  Goal: Verbalizes/displays adequate comfort level or baseline comfort level  6/21/2022 0947 by Gigi Rendon RN  Outcome: Progressing  6/21/2022 0228 by Anmol Baeza RN  Outcome: Progressing     Problem: ABCDS Injury Assessment  Goal: Absence of physical injury  6/21/2022 0947 by Gigi Rendon RN  Outcome: Progressing  6/21/2022 0228 by Anmol Baeza RN  Outcome: Progressing

## 2022-06-21 NOTE — PROGRESS NOTES
Postoperative day #2  Patient feels better tolerating a diet drainage from the Yeison-Rowland has subsided significantly    Vital signs are stable he is afebrile    Chest clear  Cardiovascular regular rate and rhythm  Abdomen is soft without tenderness JOHANNY drain appears more serous in nature  Extremities without calf tenderness    Laboratories total bilirubin stands at 0.7    Assessment plan patient's Yeison-Rowland drain can be discontinued he can be discharged later today on oral Levaquin or an acceptable substitute for an additional 5 days he should not lift more than 5 pounds for 4 weeks he may shower he needs to follow-up in the outpatient surgery clinic in approximately 5 to 7 days for staple removal

## 2022-06-21 NOTE — PROGRESS NOTES
Shift assessment complete. VSS. Medications given per mar. Tolerated well. Pt ate 100% of breakfast. JOHANNY drain to bulb suctions 10mL tan output. Pt denies any pain. The care plan and education has been reviewed and mutually agreed upon with the patient. Safety precautions in place. JOHANNY drain removed with no complications. Pt tolerated well. Went over discharge instructions. All questions answered. IV removed without any complications. Transferred pt via wheelchair.      Richa Allen RN

## 2022-06-30 ENCOUNTER — OFFICE VISIT (OUTPATIENT)
Dept: SURGERY | Age: 32
End: 2022-06-30

## 2022-06-30 VITALS — BODY MASS INDEX: 34.33 KG/M2 | WEIGHT: 200 LBS | DIASTOLIC BLOOD PRESSURE: 86 MMHG | SYSTOLIC BLOOD PRESSURE: 120 MMHG

## 2022-06-30 DIAGNOSIS — K81.0 ACUTE CHOLECYSTITIS: Primary | ICD-10-CM

## 2022-06-30 PROCEDURE — 99024 POSTOP FOLLOW-UP VISIT: CPT | Performed by: SURGERY

## 2022-06-30 NOTE — PROGRESS NOTES
Status post laparoscopic cholecystectomy per Dr. Enzo Hodge  Doing well without complaint  Incisions healing well  Staples removed  Pathology shows acute cholecystitis with transmural necrosis and cholelithiasis  There is no dictation on the cholangiogram  Cholangiogram was not able to be opened in epic  Follow-up as needed  Continue lifting restrictions for 2 more weeks

## (undated) DEVICE — CORD ES L10FT MPLR LAP

## (undated) DEVICE — C-ARM: Brand: UNBRANDED

## (undated) DEVICE — COVER LT HNDL BLU PLAS

## (undated) DEVICE — SET EXTN PRIMING 4.9ML L30IN INCL SLDE CLMP SPIN M LUERLOCK

## (undated) DEVICE — APPLIER CLP M/L SHFT DIA5MM 15 LIG LIGAMAX 5

## (undated) DEVICE — NEEDLE INSUF L120MM DIA2MM DISP FOR PNEUMOPERI ENDOPATH

## (undated) DEVICE — CATHETER CHOLGM 4.5FR L18IN W/ MTL SUPP TB

## (undated) DEVICE — SUTURE VCRL + SZ 0 L27IN CT 3 ABSRB VCP329H

## (undated) DEVICE — TISSUE RETRIEVAL SYSTEM: Brand: INZII RETRIEVAL SYSTEM

## (undated) DEVICE — STAPLER SKIN H3.9MM WIRE DIA0.58MM CRWN 6.9MM 35 STPL ROT

## (undated) DEVICE — CATHETER CHOLANGIOGRAM 4.5 FRX3 IN W/ 20018M55 TAUT INTRO

## (undated) DEVICE — TROCAR: Brand: KII® SLEEVE

## (undated) DEVICE — TROCAR: Brand: KII FIOS FIRST ENTRY

## (undated) DEVICE — MERCY FAIRFIELD TURNOVER KIT: Brand: MEDLINE INDUSTRIES, INC.

## (undated) DEVICE — APPLICATOR MEDICATED 26 CC SOLUTION HI LT ORNG CHLORAPREP

## (undated) DEVICE — STERILE POLYISOPRENE POWDER-FREE SURGICAL GLOVES: Brand: PROTEXIS

## (undated) DEVICE — DRAPE,LAP,CHOLE,W/TROUGHS,STERILE: Brand: MEDLINE

## (undated) DEVICE — Device

## (undated) DEVICE — NEEDLE HYPO 22GA L1.5IN BLK POLYPR HUB S STL REG BVL STR

## (undated) DEVICE — ADHESIVE SKIN CLSR 0.7ML TOP DERMBND ADV

## (undated) DEVICE — SOLUTION IRRIG 1000ML 0.9% SOD CHL USP POUR PLAS BTL

## (undated) DEVICE — PLUMEPORT LAPAROSCOPIC SMOKE FILTRATION DEVICE: Brand: PLUMEPORT ACTIV

## (undated) DEVICE — SUTURE VCRL + SZ 4-0 L18IN ABSRB UD L19MM PS-2 3/8 CIR PRIM VCP496H

## (undated) DEVICE — BLADE CLIPPER GEN PURP NS

## (undated) DEVICE — ELECTRODE PT RET AD L9FT HI MOIST COND ADH HYDRGEL CORDED

## (undated) DEVICE — SYRINGE, LUER LOCK, 10ML: Brand: MEDLINE

## (undated) DEVICE — LAPAROSCOPIC SCISSORS: Brand: EPIX LAPAROSCOPIC SCISSORS

## (undated) DEVICE — LAPAROSCOPY PACK: Brand: MEDLINE INDUSTRIES, INC.

## (undated) DEVICE — SYRINGE MED 30ML STD CLR PLAS LUERLOCK TIP N CTRL DISP